# Patient Record
Sex: MALE | Race: WHITE | URBAN - METROPOLITAN AREA
[De-identification: names, ages, dates, MRNs, and addresses within clinical notes are randomized per-mention and may not be internally consistent; named-entity substitution may affect disease eponyms.]

---

## 2017-10-11 ENCOUNTER — HOSPITAL ENCOUNTER (EMERGENCY)
Facility: CLINIC | Age: 22
Discharge: HOME | End: 2017-10-11
Attending: EMERGENCY MEDICINE | Admitting: EMERGENCY MEDICINE

## 2017-10-11 VITALS
HEART RATE: 66 BPM | RESPIRATION RATE: 12 BRPM | DIASTOLIC BLOOD PRESSURE: 88 MMHG | SYSTOLIC BLOOD PRESSURE: 120 MMHG | OXYGEN SATURATION: 98 % | TEMPERATURE: 97.7 F

## 2017-10-11 DIAGNOSIS — N48.1 BALANITIS: Primary | ICD-10-CM

## 2017-10-11 PROCEDURE — 99282 EMERGENCY DEPT VISIT SF MDM: CPT

## 2017-10-11 RX ORDER — CLOTRIMAZOLE 1 %
CREAM (GRAM) TOPICAL 2 TIMES DAILY
Qty: 45 G | Refills: 0 | Status: SHIPPED | OUTPATIENT
Start: 2017-10-11 | End: 2017-10-26

## 2017-10-11 ASSESSMENT — ENCOUNTER SYMPTOMS
HEMATURIA: 0
DIFFICULTY URINATING: 0
FLANK PAIN: 0
DYSURIA: 0
FREQUENCY: 0

## 2017-10-11 NOTE — ED NOTES
Introduced myself to patient he requested to speak to a male doctor only. He has something personal that he only wants to share with MD.  Orientated to room and call light, asked if he needed an interpretor phone patient declined he speaks good enough english.

## 2017-10-11 NOTE — ED AVS SNAPSHOT
[facility] Emergency Department    [address]    [facility] [address]    Phone:  [phone]    Fax:  [phone]                                       Bridger Rosales   MRN: [id number]    Department:  [facility] Emergency Department   Date of Visit:  10/11/2017           Patient Information     Date Of Birth          1995        Your diagnoses for this visit were:     Balanitis        You were seen by Hardy Persaud MD.      Follow-up Information     Follow up with [facility] Emergency Department.    Specialty:  EMERGENCY MEDICINE    Why:  If symptoms worsen    Contact information:    [address]  [facility] [address]  [phone]        Discharge Instructions           Balanitis [Balanitis]    La Balanitis es orquidea inflamación en la ashley del pene (glande) Puede ocurrir a partir de orquidea acumulación de gérmenes (bacterias o virus) bajo el prepucio. Con mayor frecuencia se trata de orquidea complicación de la diabetes. También puede ocurrir por obesidad o hábitos higiénicos pobres de los genitales.  Si no se trata de inmediato, esto puede conducir a orquidea condición llamada Fimosis. Ésta es la incapacidad de tirar hacia atrás (retraer) desde el glande el prepucio.  Los síntomas de la balanitis pueden incluir dolor del pene o sensibilidad al tacto, descarga de líquido, imposibilidad de retraer el prepucio, dificultad para orinar e impotencia.  Cuidado En Casa:  1. Si puede retraer el prepucio:    Para los niños, retraiga el prepucio y lave con agua. Aplique Bacitracin crema o pomada al glande laith veces al día.    Para los adultos, retraiga el prepucio y lave con agua. A menos que le hayan recetado otro medicamento, aplique clotrimazol crema (Lotrimin, Mycelex) (disponible sin receta) al glande laith veces al día.  2. Si usted es diabético, hable con corral médico para lograr un buen control de corral diabetes.  3. Si tiene sobrepeso, hable con corral médico para  hacer un plan para adelgazar.  Seguimiento  con corral médico o de acuerdo a lo indicado por nuestro personal.  Busque Prontamente Atención Médica  si algo de lo siguiente ocurre:    Incapacidad para regresar el prepucio retraído a la posición original ([place] requiere atención inmediata!)    Empeoramiento de nasrin síntomas    Bloqueo parcial o total del flujo de orina  Date Last Reviewed: 3/10/2014    [id number] [employer]. [address] [place] [address]. Todos los derechos reservados. Esta información no pretende sustituir la atención médica profesional. Sólo corral médico puede diagnosticar y tratar un problema de nyla.        Balanitis    Balanitis is an inflammation of the head of the penis. It can happen because of a buildup of germs (bacteria, viruses, or fungi) under the foreskin. It can also happen because of exposure to soaps and other chemicals. In adults, this is most often a complication of diabetes. It can also happen because of obesity or poor genital cleaning habits.  If it is not treated right away, this can lead to a condition called phimosis. This means you cannot pull back the foreskin from the head of the penis.  Symptoms of balanitis may include pain or tenderness of the penis, discharge, inability to retract the foreskin, difficulty urinating, and impotence.  Home care  The following guidelines will help you care for your condition at home:    If you are able to retract your foreskin:    Children. Retract the foreskin and clean with water. Apply antibiotic cream or ointment to the penis three times a day.    Adults. Retract the foreskin and clean with water. Apply clotrimazole cream to the penis 3 times a day unless another medicine was prescribed. Clotrimazole cream is available over the counter. Avoid sexual activity while being treated.    Sitz baths. Soak the penis and foreskin in warm water while inflammation is present.    If you have diabetes, talk with your doctor about  keeping your diabetes in good control.    If you are overweight, talk with your doctor about a weight loss plan.  Follow-up care  Follow up with your healthcare provider, or as advised.  When to seek medical advice  Call your healthcare provider right away if any of these occur:    You can't retract the foreskin    You can't return the retracted foreskin to the forward position. This requires immediate attention!    Your symptoms get worse    You have partial or complete blockage of the flow of urine  Date Last Reviewed: 9/1/2016 2000-2017 [employer]. [address] [place] [address]. All rights reserved. This information is not intended as a substitute for professional medical care. Always follow your healthcare professional's instructions.          24 Hour Appointment Hotline       To make an appointment at any [facility], call [phone] ([phone]). If you don't have a family doctor or clinic, we will help you find one. [facility] clinics are conveniently located to serve the needs of you and your family.             Review of your medicines      START taking        Dose / Directions Last dose taken    clotrimazole 1 % cream   Commonly known as:  LOTRIMIN   Quantity:  45 g        Apply topically 2 times daily for 15 days   Refills:  0                Prescriptions were sent or printed at these locations (1 Prescription)                   Other Prescriptions                Printed at Department/Unit printer (1 of 1)         clotrimazole (LOTRIMIN) 1 % cream                Orders Needing Specimen Collection     None      Pending Results     No orders found from 10/9/2017 to 10/12/2017.            Pending Culture Results     No orders found from 10/9/2017 to 10/12/2017.            Pending Results Instructions     If you had any lab results that were not finalized at the time of your Discharge, you can call the ED Lab Result RN at [phone]. You will be contacted by this team  for any positive Lab results or changes in treatment. The nurses are available 7 days a week from 10A to 6:30P.  You can leave a message 24 hours per day and they will return your call.        Test Results From Your Hospital Stay               Clinical Quality Measure: Blood Pressure Screening     Your blood pressure was checked while you were in the emergency department today. The last reading we obtained was  BP: 136/84 . Please read the guidelines below about what these numbers mean and what you should do about them.  If your systolic blood pressure (the top number) is less than 120 and your diastolic blood pressure (the bottom number) is less than 80, then your blood pressure is normal. There is nothing more that you need to do about it.  If your systolic blood pressure (the top number) is 120-139 or your diastolic blood pressure (the bottom number) is 80-89, your blood pressure may be higher than it should be. You should have your blood pressure rechecked within a year by a primary care provider.  If your systolic blood pressure (the top number) is 140 or greater or your diastolic blood pressure (the bottom number) is 90 or greater, you may have high blood pressure. High blood pressure is treatable, but if left untreated over time it can put you at risk for heart attack, stroke, or kidney failure. You should have your blood pressure rechecked by a primary care provider within the next 4 weeks.  If your provider in the emergency department today gave you specific instructions to follow-up with your doctor or provider even sooner than that, you should follow that instruction and not wait for up to 4 weeks for your follow-up visit.        Thank you for choosing [facility]       Thank you for choosing [facility] for your care. Our goal is always to provide you with excellent care. Hearing back from our patients is one way we can continue to improve our services. Please take a few minutes to complete the written survey  "that you may receive in the mail after you visit with us. Thank you!        [employer] Information     [employer] lets you send messages to your doctor, view your test results, renew your prescriptions, schedule appointments and more. To sign up, go to [email or web address] . Click on \"Log in\" on the left side of the screen, which will take you to the Welcome page. Then click on \"Sign up Now\" on the right side of the page.     You will be asked to enter the access code listed below, as well as some personal information. Please follow the directions to create your username and password.     Your access code is: [email or web address]  Expires: 2018  9:01 AM     Your access code will  in 90 days. If you need help or a new code, please call your [facility] clinic or [phone].        Care EveryWhere ID     This is your Care EveryWhere ID. This could be used by other organizations to access your [facility] medical records  [id number]        Equal Access to Services     SUZIE DOMINGUEZ : Hadmago browno Socarmela, waaxda luqadaha, qaybta kaalmada sarbjit, carlos vyas . So [facility] [phone].    ATENCIÓN: Si habla español, tiene a corral disposición servicios gratuitos de asistencia lingüística. Llame al [phone].    We comply with applicable federal civil rights laws and [place] laws. We do not discriminate on the basis of race, color, national origin, age, disability, sex, sexual orientation, or gender identity.            After Visit Summary       This is your record. Keep this with you and show to your community pharmacist(s) and doctor(s) at your next visit.                  "

## 2017-10-11 NOTE — ED NOTES
"Pt unwilling to talk to nurse regarding ED visit \"I want to talk with dr man to man.\" MD aware.   "

## 2017-10-11 NOTE — DISCHARGE INSTRUCTIONS
Balanitis [Balanitis]    La Balanitis es orquidea inflamación en la ashley del pene (glande) Puede ocurrir a partir de orquidea acumulación de gérmenes (bacterias o virus) bajo el prepucio. Con mayor frecuencia se trata de orquidea complicación de la diabetes. También puede ocurrir por obesidad o hábitos higiénicos pobres de los genitales.  Si no se trata de inmediato, esto puede conducir a orquidea condición llamada Fimosis. Ésta es la incapacidad de tirar hacia atrás (retraer) desde el glande el prepucio.  Los síntomas de la balanitis pueden incluir dolor del pene o sensibilidad al tacto, descarga de líquido, imposibilidad de retraer el prepucio, dificultad para orinar e impotencia.  Cuidado En Casa:  1. Si puede retraer el prepucio:    Para los niños, retraiga el prepucio y lave con agua. Aplique Bacitracin crema o pomada al glande laith veces al día.    Para los adultos, retraiga el prepucio y lave con agua. A menos que le hayan recetado otro medicamento, aplique clotrimazol crema (Lotrimin, Mycelex) (disponible sin receta) al glande laith veces al día.  2. Si usted es diabético, hable con corral médico para lograr un buen control de corral diabetes.  3. Si tiene sobrepeso, hable con corral médico para hacer un plan para adelgazar.  Seguimiento  con corral médico o de acuerdo a lo indicado por nuestro personal.  Busque Prontamente Atención Médica  si algo de lo siguiente ocurre:    Incapacidad para regresar el prepucio retraído a la posición original ([place] requiere atención inmediata!)    Empeoramiento de nasrin síntomas    Bloqueo parcial o total del flujo de orina  Date Last Reviewed: 3/10/2014    [id number] [facility]. [address] [place] [address], [place] [address]. Todos los derechos reservados. Esta información no pretende sustituir la atención médica profesional. Sólo corral médico puede diagnosticar y tratar un problema de nyla.        Balanitis    Balanitis is an inflammation of the head of the penis. It can happen because of a  buildup of germs (bacteria, viruses, or fungi) under the foreskin. It can also happen because of exposure to soaps and other chemicals. In adults, this is most often a complication of diabetes. It can also happen because of obesity or poor genital cleaning habits.  If it is not treated right away, this can lead to a condition called phimosis. This means you cannot pull back the foreskin from the head of the penis.  Symptoms of balanitis may include pain or tenderness of the penis, discharge, inability to retract the foreskin, difficulty urinating, and impotence.  Home care  The following guidelines will help you care for your condition at home:    If you are able to retract your foreskin:    Children. Retract the foreskin and clean with water. Apply antibiotic cream or ointment to the penis three times a day.    Adults. Retract the foreskin and clean with water. Apply clotrimazole cream to the penis 3 times a day unless another medicine was prescribed. Clotrimazole cream is available over the counter. Avoid sexual activity while being treated.    Sitz baths. Soak the penis and foreskin in warm water while inflammation is present.    If you have diabetes, talk with your doctor about keeping your diabetes in good control.    If you are overweight, talk with your doctor about a weight loss plan.  Follow-up care  Follow up with your healthcare provider, or as advised.  When to seek medical advice  Call your healthcare provider right away if any of these occur:    You can't retract the foreskin    You can't return the retracted foreskin to the forward position. This requires immediate attention!    Your symptoms get worse    You have partial or complete blockage of the flow of urine  Date Last Reviewed: 9/1/2016 2000-2017 [employer]. [address] [place] [address], [place] [address]. All rights reserved. This information is not intended as a substitute for professional medical care. Always follow your  healthcare professional's instructions.

## 2017-10-11 NOTE — ED PROVIDER NOTES
History     Chief Complaint:  other       HPI   Bridger Rosales is a 22 year old male who presents with white film overlying head of his penis over the last several weeks, nonpainful.  Patient states he is sexually active with female partners.  Denies history of STDs.  Denies discharge on the penis.  Denies pain with urination.  Denies testicular pain.  Denies fever or constitutional symptoms.  No set when he pulls back the foreskin on his penis he has some curd-like white substance.  Does not appear severe today although his had multiple presentations of this curd-like white substance over the last several weeks.  Requesting a cream today.  Denies abdominal pain, chest pain, shortness of breath.  Denies history of diabetes.    Allergies:    None    Medications:    None    Past Medical History:    History reviewed. No pertinent past medical history.    There are no active problems to display for this patient.       Past Surgical History:    History reviewed. No pertinent surgical history.     Family History:    Non-contriubtory    Social History:   reports that he has never smoked. He does not have any smokeless tobacco history on file. He reports that he does not drink alcohol.    PCP: No primary care provider on file.     Review of Systems   Genitourinary: Negative for difficulty urinating, discharge, dysuria, flank pain, frequency, genital sores, hematuria, penile pain, penile swelling, scrotal swelling and testicular pain.   All other systems reviewed and are negative.      Physical Exam     Patient Vitals for the past 24 hrs:   BP Temp Temp src Pulse Resp SpO2   10/11/17 0830 136/84 97.7  F (36.5  C) Oral 73 16 98 %      Physical Exam  General: Resting comfortably on the gurney  Head:  The scalp, face, and head appear normal  Eyes:  The pupils are normal    Conjunctivae and sclera appear normal  ENT:    The nose is normal    Ears/pinnae are normal  CV:  RRR.  No murmur  Resp:  CTAB.  No wheezing  Neck:  Normal  range of motion  MS:  No deformities.  Normal ROM of extremities  :  Non-circumcised, curd like white substance at base of glans penis.  No discharge from urethra.  No genital ulcers or lesions.  No testicular pain.  No masses, swelling or lesions.   Skin:  No rash or lesions noted.  Neuro: Speech is normal and fluent  Psych:  Awake. Alert.  Normal affect.      Appropriate interactions    Emergency Department Course   Imaging:  No orders to display      Laboratory:  Labs Ordered and Resulted from Time of ED Arrival Up to the Time of Departure from the ED - No data to display     Interventions:  Medications - No data to display     Impression & Plan    Medical Decision Making:  Patient is a 22-year-old male who presents with balanitis.  Patient with no history of diabetes.  Patient with minimal symptoms right now.  No itching no discharge from his penis.  Low concern for STDs or other sinister pathologies.  Patient from [facility] and counseled to return and follow-up with his primary care physician within one week.  Patient given clotrimazole cream for suspected balanitis.  Patient is noncircumcised was educated on ensuring to dry the head of the penis after showers and bathing.  Patient counseled to return to emergency for signs and symptoms. discharged home.    Diagnosis:    ICD-10-CM    1. Balanitis N48.1         Discharge Medications:  New Prescriptions    CLOTRIMAZOLE (LOTRIMIN) 1 % CREAM    Apply topically 2 times daily for 15 days        10/11/2017   Hardy Persaud MD [place], [place], MD  10/11/17 0859

## 2017-10-11 NOTE — ED AVS SNAPSHOT
[facility] Emergency Department    [address]    [facility] [address]    Phone:  [phone]    Fax:  [phone]                                       Bridger Rosales   MRN: [id number]    Department:  [facility] Emergency Department   Date of Visit:  10/11/2017           After Visit Summary Signature Page     I have received my discharge instructions, and my questions have been answered. I have discussed any challenges I see with this plan with the nurse or doctor.    ..........................................................................................................................................  Patient/Patient Representative Signature      ..........................................................................................................................................  Patient Representative Print Name and Relationship to Patient    ..................................................               ................................................  Date                                            Time    ..........................................................................................................................................  Reviewed by Signature/Title    ...................................................              ..............................................  Date                                                            Time